# Patient Record
Sex: FEMALE | Race: WHITE | NOT HISPANIC OR LATINO | Employment: UNEMPLOYED | URBAN - METROPOLITAN AREA
[De-identification: names, ages, dates, MRNs, and addresses within clinical notes are randomized per-mention and may not be internally consistent; named-entity substitution may affect disease eponyms.]

---

## 2022-10-21 ENCOUNTER — TELEPHONE (OUTPATIENT)
Dept: RHEUMATOLOGY | Facility: CLINIC | Age: 47
End: 2022-10-21

## 2022-10-21 NOTE — TELEPHONE ENCOUNTER
Spoke to patient to let her know that Dr. Do panel is full at this time for medicaid and therefore he's not accepting medicaid at this time. Patient voiced understanding.

## 2022-12-05 ENCOUNTER — TELEPHONE (OUTPATIENT)
Dept: RHEUMATOLOGY | Facility: CLINIC | Age: 47
End: 2022-12-05

## 2023-12-27 PROBLEM — R21 RASH: Status: ACTIVE | Noted: 2023-12-27

## 2023-12-27 PROBLEM — R76.8 POSITIVE ANA (ANTINUCLEAR ANTIBODY): Status: ACTIVE | Noted: 2023-12-27

## 2023-12-27 PROBLEM — G70.00 MG (MYASTHENIA GRAVIS): Status: ACTIVE | Noted: 2023-12-27
